# Patient Record
Sex: MALE | Race: WHITE | Employment: UNEMPLOYED | URBAN - METROPOLITAN AREA
[De-identification: names, ages, dates, MRNs, and addresses within clinical notes are randomized per-mention and may not be internally consistent; named-entity substitution may affect disease eponyms.]

---

## 2017-07-20 ENCOUNTER — OFFICE VISIT (OUTPATIENT)
Dept: FAMILY MEDICINE CLINIC | Age: 27
End: 2017-07-20

## 2017-07-20 VITALS
SYSTOLIC BLOOD PRESSURE: 120 MMHG | OXYGEN SATURATION: 98 % | DIASTOLIC BLOOD PRESSURE: 70 MMHG | HEIGHT: 73 IN | WEIGHT: 139 LBS | HEART RATE: 110 BPM | BODY MASS INDEX: 18.42 KG/M2

## 2017-07-20 DIAGNOSIS — Z00.00 WELL ADULT EXAM: ICD-10-CM

## 2017-07-20 DIAGNOSIS — Z00.00 WELL ADULT EXAM: Primary | ICD-10-CM

## 2017-07-20 DIAGNOSIS — F17.200 SMOKER: ICD-10-CM

## 2017-07-20 LAB
CHOLESTEROL, TOTAL: 161 MG/DL (ref 0–199)
GLUCOSE BLD-MCNC: 93 MG/DL (ref 70–99)
HDLC SERPL-MCNC: 50 MG/DL (ref 40–60)
LDL CHOLESTEROL CALCULATED: 103 MG/DL
TRIGL SERPL-MCNC: 38 MG/DL (ref 0–150)
VLDLC SERPL CALC-MCNC: 8 MG/DL

## 2017-07-20 PROCEDURE — 99395 PREV VISIT EST AGE 18-39: CPT | Performed by: INTERNAL MEDICINE

## 2017-07-20 RX ORDER — VARENICLINE TARTRATE 25 MG
KIT ORAL
Qty: 1 EACH | Refills: 0 | Status: SHIPPED | OUTPATIENT
Start: 2017-07-20 | End: 2022-05-20

## 2017-07-20 ASSESSMENT — PATIENT HEALTH QUESTIONNAIRE - PHQ9
SUM OF ALL RESPONSES TO PHQ QUESTIONS 1-9: 0
1. LITTLE INTEREST OR PLEASURE IN DOING THINGS: 0
2. FEELING DOWN, DEPRESSED OR HOPELESS: 0
SUM OF ALL RESPONSES TO PHQ9 QUESTIONS 1 & 2: 0

## 2022-05-20 ENCOUNTER — APPOINTMENT (OUTPATIENT)
Dept: GENERAL RADIOLOGY | Age: 32
End: 2022-05-20
Payer: MEDICAID

## 2022-05-20 ENCOUNTER — HOSPITAL ENCOUNTER (EMERGENCY)
Age: 32
Discharge: HOME | End: 2022-05-20
Attending: EMERGENCY MEDICINE
Payer: MEDICAID

## 2022-05-20 VITALS
SYSTOLIC BLOOD PRESSURE: 148 MMHG | TEMPERATURE: 98.2 F | RESPIRATION RATE: 18 BRPM | HEART RATE: 78 BPM | DIASTOLIC BLOOD PRESSURE: 89 MMHG | BODY MASS INDEX: 19.25 KG/M2 | WEIGHT: 150 LBS | HEIGHT: 74 IN | OXYGEN SATURATION: 100 %

## 2022-05-20 DIAGNOSIS — S61.411A LACERATION OF RIGHT HAND WITHOUT FOREIGN BODY, INITIAL ENCOUNTER: Primary | ICD-10-CM

## 2022-05-20 PROCEDURE — 99283 EMERGENCY DEPT VISIT LOW MDM: CPT

## 2022-05-20 PROCEDURE — 12002 RPR S/N/AX/GEN/TRNK2.6-7.5CM: CPT

## 2022-05-20 PROCEDURE — 73130 X-RAY EXAM OF HAND: CPT

## 2022-05-20 RX ORDER — DICLOFENAC SODIUM 75 MG/1
75 TABLET, DELAYED RELEASE ORAL 2 TIMES DAILY PRN
Qty: 14 TABLET | Refills: 0 | Status: SHIPPED | OUTPATIENT
Start: 2022-05-20 | End: 2022-05-27

## 2022-05-20 ASSESSMENT — PAIN DESCRIPTION - ORIENTATION: ORIENTATION: RIGHT

## 2022-05-20 ASSESSMENT — PAIN DESCRIPTION - DESCRIPTORS: DESCRIPTORS: NUMBNESS

## 2022-05-20 ASSESSMENT — PAIN DESCRIPTION - LOCATION: LOCATION: HAND

## 2022-05-20 ASSESSMENT — PAIN - FUNCTIONAL ASSESSMENT: PAIN_FUNCTIONAL_ASSESSMENT: 0-10

## 2022-05-20 ASSESSMENT — PAIN SCALES - GENERAL: PAINLEVEL_OUTOF10: 10

## 2022-05-20 NOTE — ED PROVIDER NOTES
2\" (1.88 m) 150 lb (68 kg)   Physical Exam  Vitals and nursing note reviewed. Constitutional:       General: He is not in acute distress. Appearance: Normal appearance. He is well-developed. He is not diaphoretic. HENT:      Head: Normocephalic and atraumatic. Neck:      Trachea: No tracheal deviation. Cardiovascular:      Pulses:           Radial pulses are 2+ on the right side. Pulmonary:      Effort: Pulmonary effort is normal. No respiratory distress. Musculoskeletal:      Comments: Focal tenderness of the right palm where the patient has a laceration. Patient's right middle and ring finger was held in slightly flexed position. Once patient had topical anesthesia, he was able to flex and extend his fingers without difficulty. No tendon seen on exam.   Skin:     General: Skin is warm and dry. Capillary Refill: Capillary refill takes less than 2 seconds. Findings: Laceration (3 cm laceration on the palm of the right hand) present. Neurological:      General: No focal deficit present. Mental Status: He is alert and oriented to person, place, and time. Cranial Nerves: No dysarthria or facial asymmetry. Sensory: No sensory deficit. Motor: Motor function is intact. No weakness, tremor or abnormal muscle tone. Psychiatric:         Mood and Affect: Mood is anxious. Behavior: Behavior normal.         MDM/ED Course  Radiology  XR HAND RIGHT (MIN 3 VIEWS)    Result Date: 5/20/2022  EXAMINATION: THREE XRAY VIEWS OF THE RIGHT HAND 5/20/2022 7:18 pm COMPARISON: None. HISTORY: ORDERING SYSTEM PROVIDED HISTORY: broke glass cut hand TECHNOLOGIST PROVIDED HISTORY: Reason for exam:->broke glass cut hand Reason for Exam: lac to rt hand FINDINGS: There is no acute fracture or dislocation of the right hand. There is old deformity in the distal right 5th metacarpal.  The joint spaces are intact. There is mild soft tissue swelling. No radiopaque foreign body.      No acute fracture or dislocation in the right hand. Mild soft tissue swelling.         - Patient seen and evaluated in room 4.  33 y.o. male presented for right hand laceration 2/2 broken glass. X-ray did not show radiopaque foreign body. He is neuro vastly intact on exam.  - Pertinent old records reviewed. Last Tdap November 2016. Patient is up to date. PROCEDURE:  LACERATION REPAIR  Oscar Drake or their surrogate had an opportunity to ask questions, and the risks, benefits, and alternatives were discussed. A local anesthetic was used to completely anesthetize the wound on right hand with total length of 3. The wound copiously irrigated with sterile saline under pressure to remove any debris. It was prepped and draped to maintain a sterile field. It was explored to its depth in a bloodless field with no sign of tendon, nerve, or vascular injury. No foreign bodies were identified. It was closed with 5 simple interrupted sutures using 5-0 prolene. There were no complications during the procedure. Wound care instructions given. Patient advised to have sutures removed in 10 days.  - Return precautions also discussed. patient verbalized understanding of care plan and agreed to follow-up with PCP as advised. I estimate there is LOW risk for CELLULITIS, COMPARTMENT SYNDROME, NECROTIZING FASCIITIS, TENDON OR NEUROVASCULAR INJURY, or FOREIGN BODY, thus I consider the discharge disposition reasonable. Also, there is no evidence or peritonitis, sepsis, or toxicity. Oscar Drake and I have discussed the diagnosis and risks, and we agree with discharging home to follow-up with PCP. We also discussed returning to the Emergency Department immediately if new or worsening symptoms occur. We have discussed the symptoms which are most concerning (e.g., changing or worsening pain, fever, numbness, weakness, cool or painful digits) that necessitate immediate return. Clinical Impression:  1.  Laceration of right hand without foreign body, initial encounter          Disposition:  Discharge to home in good condition. Blood pressure (!) 148/89, pulse 78, temperature 98.2 °F (36.8 °C), temperature source Oral, resp. rate 18, height 6' 2\" (1.88 m), weight 150 lb (68 kg), SpO2 100 %. Patient was given scripts for the following medications. I counseled patient how to take these medications. Discharge Medication List as of 5/20/2022  9:30 PM      START taking these medications    Details   diclofenac (VOLTAREN) 75 MG EC tablet Take 1 tablet by mouth 2 times daily as needed for Pain, Disp-14 tablet, R-0Normal             Disposition referral (if applicable): Teto Polanco MD  [address] [address]  [phone]    Schedule an appointment as soon as possible for a visit   For wound re-check, For suture removal in 10 days     This chart was generated in part by using Dragon Dictation system and may contain errors related to that system including errors in grammar, punctuation, and spelling, as well as words and phrases that may be inappropriate. If there are any questions or concerns please feel free to contact the dictating provider for clarification.      Jennifer Johnson MD  [facility] Lavern Silvestre MD  05/22/22 [id number]

## 2022-05-21 NOTE — ED NOTES
Discharge instructions and medications reviewed with patient. Patient verbalized understanding of medications and follow up. All questions answered at this time. Skin warm, pink, and dry. Patient alert and oriented x4. Pt ambulated to lobby with a stable gait. Patient discharged home with 1 prescriptions.       La Barrios RN  05/20/22 [id number]

## 2023-03-27 ENCOUNTER — HOSPITAL ENCOUNTER (EMERGENCY)
Age: 33
Discharge: HOME | End: 2023-03-27
Attending: EMERGENCY MEDICINE
Payer: MEDICAID

## 2023-03-27 VITALS
RESPIRATION RATE: 18 BRPM | HEIGHT: 74 IN | DIASTOLIC BLOOD PRESSURE: 78 MMHG | TEMPERATURE: 97.9 F | HEART RATE: 82 BPM | BODY MASS INDEX: 21.17 KG/M2 | WEIGHT: 165 LBS | SYSTOLIC BLOOD PRESSURE: 138 MMHG | OXYGEN SATURATION: 99 %

## 2023-03-27 DIAGNOSIS — Z20.2 POSSIBLE EXPOSURE TO STD: Primary | ICD-10-CM

## 2023-03-27 LAB — TRICHOMONAS #/AREA URNS HPF: NORMAL /[HPF]

## 2023-03-27 PROCEDURE — 81015 MICROSCOPIC EXAM OF URINE: CPT

## 2023-03-27 PROCEDURE — 87591 N.GONORRHOEAE DNA AMP PROB: CPT

## 2023-03-27 PROCEDURE — 87491 CHLMYD TRACH DNA AMP PROBE: CPT

## 2023-03-27 PROCEDURE — 99283 EMERGENCY DEPT VISIT LOW MDM: CPT

## 2023-03-27 ASSESSMENT — PAIN - FUNCTIONAL ASSESSMENT: PAIN_FUNCTIONAL_ASSESSMENT: NONE - DENIES PAIN

## 2023-03-27 ASSESSMENT — LIFESTYLE VARIABLES
HOW OFTEN DO YOU HAVE A DRINK CONTAINING ALCOHOL: NEVER
HOW MANY STANDARD DRINKS CONTAINING ALCOHOL DO YOU HAVE ON A TYPICAL DAY: 1 OR 2

## 2023-03-27 NOTE — ED PROVIDER NOTES
[facility] [facility]        Pt Name: Maria Elena Mccoy  MRN: [id number]  [place] 1990  Date of evaluation: 3/27/2023  Provider: Jacey Estevez MD  PCP: Irma Chowdhury MD  Note Started: 12:40 PM EDT 3/27/23    CHIEF COMPLAINT       Chief Complaint   Patient presents with    Exposure to STD     Partner dx'd with STD requesting testing/treatment prophylaxis - not symptomatic. HISTORY OF PRESENT ILLNESS: 1 or more Elements     History from : Patient    Limitations to history : None    Maria Elena Mccoy is a 29 y.o. male who presents to the emergency department for testing for STDs. Patient states that his significant other was recently tested positive for trichomonas. Patient has no symptoms. He states has been tested for STDs in the past but does not know if he is ever been tested for this 1. He has no symptoms. Nursing Notes were all reviewed and agreed with or any disagreements were addressed in the HPI. REVIEW OF SYSTEMS :      Review of Systems    5 systems reviewed and negative except as in HPI/MDM    SURGICAL HISTORY   No past surgical history on file. CURRENTMEDICATIONS       Previous Medications    DICLOFENAC (VOLTAREN) 75 MG EC TABLET    Take 1 tablet by mouth 2 times daily as needed for Pain       ALLERGIES     Patient has no known allergies.     FAMILYHISTORY       Family History   Problem Relation Age of Onset    Heart Disease Father     Heart Disease Brother     Heart Disease Paternal Grandmother         SOCIAL HISTORY       Social History     Tobacco Use    Smoking status: Every Day     Packs/day: 1.00     Types: Cigarettes, E-Cigarettes    Smokeless tobacco: Former    Tobacco comments:     counselled on cessation- 5/25/2016   Vaping Use    Vaping Use: Every day   Substance Use Topics    Alcohol use: No     Alcohol/week: 0.0 standard drinks    Drug use: No     Comment: 2+ times a month       SCREENINGS

## 2023-03-27 NOTE — DISCHARGE INSTRUCTIONS
STD Clinics  FOR MORE INFORMATION ABOUT STDS, CONTACT YOUR PHYSICIAN OR:    Sexually [address] Department                              [facility] [address]                    [phone]    Sexually [facility] MEDICAL Department                              [address] [address]                    [phone]    [facility]                  [address]                   [place], [address]                                           [phone]                      [facility]

## 2023-03-28 LAB
C TRACH DNA UR QL NAA+PROBE: NEGATIVE
N GONORRHOEA DNA UR QL NAA+PROBE: NEGATIVE

## 2025-03-07 ENCOUNTER — TRANSCRIBE ORDERS (OUTPATIENT)
Dept: ADMINISTRATIVE | Age: 35
End: 2025-03-07

## 2025-03-07 DIAGNOSIS — R22.1 NECK MASS: Primary | ICD-10-CM
